# Patient Record
Sex: FEMALE | Race: WHITE
[De-identification: names, ages, dates, MRNs, and addresses within clinical notes are randomized per-mention and may not be internally consistent; named-entity substitution may affect disease eponyms.]

---

## 2018-02-12 NOTE — ER DOCUMENT REPORT
ED General





- General


Chief Complaint: Abdominal Cramping


Stated Complaint: PELVIC PAIN


Mode of Arrival: Ambulatory


Information source: Patient


TRAVEL OUTSIDE OF THE [place] IN LAST 30 DAYS: No





- HPI


Notes: 





27 yr old female presents today with complaints with acute onset of pelvic pain 

after she had sex with her  last night, is concerned that her IUD 

shifted. Denies any vaginal bleeding, vaginal discharge.  Reports she has a 

history of ovarian cysts on both sides.  States that she has had issues with 

ovarian cysts in the past and this pain does not feel like an ovarian cyst 

pain.  Denies pregnancy, cp, sob, n/v/d. denies issues with bowel, urinary 

incontinence or saddle anesthesia since onset of symptoms. denies hx of pid, 

endometriosis or internal surgeries. Pain 1/10, cramping.  Denies fevers or 

chills. Denies any trauma. No otc meds have been tried.  Finally had her Pap 

done a month ago by her OB/GYN





- Related Data


Allergies/Adverse Reactions: 


 





latex Allergy (Verified 18 09:33)


 











Past Medical History





- General


Information source: Patient





- Social History


Smoking Status: Current Every Day Smoker


Frequency of alcohol use: Rare


Drug Abuse: None


Family History: Reviewed & Not Pertinent


Patient has suicidal ideation: No


Patient has homicidal ideation: No


Renal/ Medical History: Denies: Hx Peritoneal Dialysis


Past Surgical History: Reports: Hx  Section - X1, Hx Cholecystectomy, 

Hx Orthopedic Surgery - right ankle, Hx Tonsillectomy





Review of Systems





- Review of Systems


Constitutional: No symptoms reported


EENT: No symptoms reported


Cardiovascular: No symptoms reported


Respiratory: No symptoms reported


Gastrointestinal: No symptoms reported


Genitourinary: No symptoms reported


Female Genitourinary: See HPI


Musculoskeletal: No symptoms reported


Skin: No symptoms reported


Hematologic/Lymphatic: No symptoms reported


Neurological/Psychological: No symptoms reported





Physical Exam





- Vital signs


Vitals: 


 











Temp Pulse Resp BP Pulse Ox


 


 97.9 F   83   16   106/59 L  98 


 


 18 09:40  18 09:40  18 09:40  18 09:40  18 09:40











Interpretation: Normal





- Notes


Notes: 





PHYSICAL EXAMINATION:





GENERAL: Well-appearing, well-nourished and in no acute distress.





HEAD: Atraumatic, normocephalic.





EYES: Pupils equal round and reactive to light, extraocular movements intact, 

conjunctiva are normal.





ENT: Nares patent, oropharynx clear without exudates.  Moist mucous membranes.





NECK: Normal range of motion, supple without lymphadenopathy





LUNGS: Breath sounds clear to auscultation bilaterally and equal.  No wheezes 

rales or rhonchi.





HEART: Regular rate and rhythm without murmurs





ABDOMEN: Soft, nontender, nondistended abdomen.  No guarding, no rebound.  No 

masses appreciated.





Female exam:  External genitalia without erythema, exudate or discharge. 

Vaginal vault is without discharge. Cervix is of normal color without lesion. 

There is no bleeding noted. Uterus is noted to be of normal size and nontender. 

No cervical motion tenderness is seen. No masses are palpated. No string seen 

from IUD.





Musculoskeletal: Normal range of motion, no pitting or edema.  No cyanosis.





NEUROLOGICAL: Cranial nerves grossly intact.  Normal speech, normal gait.  

Normal sensory, motor exams





PSYCH: Normal mood, normal affect.





SKIN: Warm, Dry, normal turgor, no rashes or lesions noted.





Course





- Re-evaluation


Re-evalutation: 








Rechecked the patient who is resting comfortably. On re-exam, patient is 

symptomatically improved. Discussed the results of the labs/radiology as well 

as the diagnosis at great length.  IUD is in place, likely her pain is coming 

from her cysts.  Advised to follow-up with her OB/GYN within 3 days.  Take over-

the-counter ibuprofen Tylenol for pain control, apply heat 20 minutes on 20 

minutes off several times a day.  Avoid sexual intercourse until symptoms have 

resolved.  Discussed the need to return to the ER for any new or worsening sx.  

All questions answered. Patient comfortable with the decision to go home.


18 12:45








- Vital Signs


Vital signs: 


 











Temp Pulse Resp BP Pulse Ox


 


 97.9 F   85   16   106/59 L  97 


 


 18 09:42  18 09:42  18 09:42  18 09:42  18 09:42














- Laboratory


Laboratory results interpreted by me: 


 











  18





  10:10


 


Urine Urobilinogen  4.0 H














Discharge





- Discharge


Clinical Impression: 


Ovarian cyst


Qualifiers:


 Laterality: bilateral Qualified Code(s): N83.201 - Unspecified ovarian cyst, 

right side





Condition: Good


Disposition: HOME, SELF-CARE


Instructions:  Ovarian Cyst (Formerly [facility])


Additional Instructions: 


Ovarian Cyst





     Your examination shows the presence of an ovarian cyst.  This is a ball of 

fluid attached to the ovary.  Ovarian cysts in women of child-bearing age are 

usually innocent.  However, the cyst may cause pain when it grows or bursts.  

An innocent ovarian cyst will usually go away by itself.


     When the cyst becomes painful, you should rest.  Pain medication may be 

required.  Some women find a hot water bottle soothing.  The pain usually 

resolves within one or two days.


     After menopause, an ovarian cyst may mean a tumor, and requires more 

aggressive evaluation -- usually surgery is recommended to remove or biopsy the 

cyst.  A very large cyst requires evaluation at any age. Most cysts (even the 

innocent ones) require follow-up examination.


     Call the doctor or return at any time if the pain increases significantly, 

if you become faint, or if you experience vaginal bleeding.





follow up with OB/GYN in 3 days.  Take over-the-counter ibuprofen and Tylenol 

as needed for pain.  Apply heat 20 minutes on 20 minutes off several 2 a day 

to affected area.  Return to emergency room symptoms become worse.  Return 

immediately for any new or worsening symptoms.





Follow up with primary care provider, call tomorrow to make followup 

appointment.


Referrals: 


RAMA CAICEDO MD [ACTIVE STAFF] -  (in 3-5 days)

## 2018-12-05 LAB
APPEARANCE UR: CLEAR
APTT PPP: YELLOW S
BILIRUB UR QL STRIP: NEGATIVE
ERYTHROCYTE [DISTWIDTH] IN BLOOD BY AUTOMATED COUNT: 13.3 %
GLUCOSE UR STRIP-MCNC: NEGATIVE MG/DL
HCT VFR BLD CALC: 42.3 %
HGB BLD-MCNC: 14.4 G/DL
KETONES UR STRIP-MCNC: NEGATIVE MG/DL
MCH RBC QN AUTO: 30.4 PG
MCHC RBC AUTO-ENTMCNC: 34.1 G/DL
MCV RBC AUTO: 89 FL
NITRITE UR QL STRIP: NEGATIVE
PH UR STRIP: 7 [PH]
PLATELET # BLD: 254 10^3/UL
PROT UR STRIP-MCNC: NEGATIVE MG/DL
RBC # BLD AUTO: 4.74 10^6/UL
SP GR UR STRIP: 1.01
UROBILINOGEN UR-MCNC: NEGATIVE MG/DL
WBC # BLD AUTO: 6.9 10^3/UL

## 2018-12-12 ENCOUNTER — HOSPITAL ENCOUNTER (OUTPATIENT)
Dept: HOSPITAL 62 - OROUT | Age: 28
Discharge: HOME | End: 2018-12-12
Attending: OBSTETRICS & GYNECOLOGY
Payer: MEDICAID

## 2018-12-12 VITALS — DIASTOLIC BLOOD PRESSURE: 72 MMHG | SYSTOLIC BLOOD PRESSURE: 113 MMHG

## 2018-12-12 DIAGNOSIS — Z30.2: Primary | ICD-10-CM

## 2018-12-12 DIAGNOSIS — F17.210: ICD-10-CM

## 2018-12-12 DIAGNOSIS — Z88.8: ICD-10-CM

## 2018-12-12 DIAGNOSIS — N80.8: ICD-10-CM

## 2018-12-12 DIAGNOSIS — Z91.040: ICD-10-CM

## 2018-12-12 DIAGNOSIS — Z88.5: ICD-10-CM

## 2018-12-12 DIAGNOSIS — Z01.818: ICD-10-CM

## 2018-12-12 DIAGNOSIS — N80.1: ICD-10-CM

## 2018-12-12 PROCEDURE — 85027 COMPLETE CBC AUTOMATED: CPT

## 2018-12-12 PROCEDURE — 36415 COLL VENOUS BLD VENIPUNCTURE: CPT

## 2018-12-12 PROCEDURE — 81005 URINALYSIS: CPT

## 2018-12-12 PROCEDURE — 81025 URINE PREGNANCY TEST: CPT

## 2018-12-12 PROCEDURE — 58671 LAPAROSCOPY TUBAL BLOCK: CPT

## 2018-12-12 RX ADMIN — FENTANYL CITRATE ONE MCG: 50 INJECTION INTRAMUSCULAR; INTRAVENOUS at 13:40

## 2018-12-12 RX ADMIN — PROMETHAZINE HYDROCHLORIDE ONE MG: 25 INJECTION INTRAMUSCULAR; INTRAVENOUS at 13:25

## 2018-12-12 RX ADMIN — PROMETHAZINE HYDROCHLORIDE ONE MG: 25 INJECTION INTRAMUSCULAR; INTRAVENOUS at 13:41

## 2018-12-12 RX ADMIN — FENTANYL CITRATE ONE MCG: 50 INJECTION INTRAMUSCULAR; INTRAVENOUS at 13:22

## 2018-12-13 NOTE — OPERATIVE REPORT
Operative Report


DATE OF SURGERY: 18


PREOPERATIVE DIAGNOSIS: Undesired Fertility


POSTOPERATIVE DIAGNOSIS: [place], endometriosis


OPERATION: Laparoscopic Tubal ligation with Filschie clips


SURGEON: RAMA CAICEDO


ANESTHESIA: GA


TISSUE REMOVED OR ALTERED: none


COMPLICATIONS: 


none


ESTIMATED BLOOD LOSS: less than 5ml


INTRAOPERATIVE FINDINGS: scar and probable endometriosis implants above bladder 

and in ovarian fossas, Bilateral fallopian tubes occluded with Filschie clips 

times two.


PROCEDURE: 


Anesthesiologist: Malena Daley MD, CRNA





IV fluids:  [900ml]





Urine output: [400ml]





Indications: [29yo  here for scheduled Bilateral tubal ligation.  She is 

100 sure that she has completed childbearing.  She currently has a Mirena for 

contraecption and for menstrual cycle control.  Mirena is due to be removed 

Sept/Oct 2019. The risks, benefits, alternatives were reviewed with the patient 

and she desires to proceed with planned procedure.]





Procedure: The patient was taken to the operating room where general anesthesia 

was obtained without difficulty.  The patient was then examined under anesthesia

with findings as noted above with a small anteverted uterus.  She was then 

placed in dorsal supine lithotomy position and prepped and draped in the normal 

sterile fashion.  [place] speculum was then placed in the patient's vagina and 

the anterior lip of the cervix grasped with a single-tooth tenaculum.  A [employer] 

uterine manipulator was then advanced into the uterus to provide a means of 

manipulation of the uterus.  The speculum and tenaculum were then removed from 

the patient's cervix and vagina.  Attention was then turned to the patient's 

abdomen where a 5 mm infraumbilical skin incision was then made.  The Optiview 

trocar with 0 laparoscope was then advanced without difficulty under direct 

visualization with the Optiview trocar.  This was performed while tenting the 

abdominal wall and these will fashion.  Intraperitoneal placement was confirmed 

by the direct visualization.  Pneumoperitoneum was then obtained with 

approximately 4 L carbon dioxide gas.  Survey of the patient's abdomen and 

pelvis revealed findings as noted above.  A second skin incision was then made 

approximately 2 cm above the symphysis pubis in the midline.  This incisions 

were made under direct visualization with the laparoscope.  The second trochars 

was then advanced under direct visualization of the laparoscope.  The right 

fallopian tube was then identified and followed out to the fimbriated end and 

the Filschie clip was placed times two in the mid ampullary portion of the 

fallopian tube.  Attention was then turned to the left adnexa at which time the 

left fallopian tube was identified and followed out to the fimbriated end.  

Filschie clips times two on the left fallopian tube in the mid ampullary portion

of the fallopian tube.  The left and right fallopian tubes were removed easily 

through the trocar.  All operative sites were visualized and noted to be 

hemostatic.  The additional trochar was removed under direct visualization.  The

5 mm trocar was then removed after abdominal insufflation was removed.  The skin

at all trocar sites were closed with 3-0 Monocryl in a subcuticular fashion with

overlying Dermabond.


No antibiotics were indicated for this procedure.  After completion of skin 

closure of the trocar sites attention was then turned to the vagina where the 

[place] uterine manipulator was removed and the bivalve speculum was replaced.  

Silver nitrate was applied to the tenaculum sites for hemostasis and the 

speculum was removed.  Sponge lap needle and instrument counts were correct 3. 

The patient tolerated the procedure well and was taken to the recovery area 

awake and in stable condition.

## 2019-01-22 ENCOUNTER — HOSPITAL ENCOUNTER (EMERGENCY)
Dept: HOSPITAL 62 - ER | Age: 29
LOS: 1 days | Discharge: HOME | End: 2019-01-23
Payer: MEDICAID

## 2019-01-22 DIAGNOSIS — T78.40XA: Primary | ICD-10-CM

## 2019-01-22 DIAGNOSIS — R21: ICD-10-CM

## 2019-01-22 DIAGNOSIS — F17.200: ICD-10-CM

## 2019-01-22 DIAGNOSIS — L50.9: ICD-10-CM

## 2019-01-22 PROCEDURE — 96372 THER/PROPH/DIAG INJ SC/IM: CPT

## 2019-01-22 PROCEDURE — 99282 EMERGENCY DEPT VISIT SF MDM: CPT

## 2019-01-23 VITALS — SYSTOLIC BLOOD PRESSURE: 116 MMHG | DIASTOLIC BLOOD PRESSURE: 80 MMHG

## 2019-01-23 NOTE — ER DOCUMENT REPORT
ED General





- General


Chief Complaint: Allergic Reaction


Stated Complaint: POSSIBLE ALLERGIC REACTION


Time Seen by Provider: 19 00:12


Mode of Arrival: Ambulatory


Information source: Patient


Notes: 





28-year-old female with no significant past medical history presents with 

complaint of pruritic rash that developed this morning.  Patient states that she

awoke this morning with an erythematous rash on her chest, abdomen back and 

upper extremities.  She denies any new exposures, new medications, new soaps, 

lotions.  Patient denies prior similar symptoms.  She states that she did spend 

the weekend with her mother and is unsure if she used anything differently.  

Patient did take Benadryl this morning, fell asleep and then awoke with 

worsening itching.  Patient drove here herself.  She denies any shortness of 

breath, wheezing, coughing, vomiting.


TRAVEL OUTSIDE OF THE [place] IN LAST 30 DAYS: No





- HPI


Onset: This morning


Onset/Duration: Sudden


Quality of pain: No pain


Associated symptoms: None.  denies: Chest pain, Nonproductive cough, Productive 

cough, Nausea, Vomiting, Shortness of breath


Exacerbated by: Denies


Relieved by: Denies


Similar symptoms previously: No


Recently seen / treated by doctor: No





- Related Data


Allergies/Adverse Reactions: 


                                        





ethinyl estradiol [From Tri-Sprintec (28)] Allergy (Verified 19 00:04)


   


latex Allergy (Verified 19 00:04)


   


norgestimate [From Tri-Sprintec (28)] Allergy (Verified 19 00:04)


   


propoxyphene [From Darvocet-N] Allergy (Verified 19 00:04)


   Anaphylaxis











Past Medical History





- General


Information source: Patient





- Social History


Smoking Status: Current Every Day Smoker


Frequency of alcohol use: Social


Drug Abuse: None


Family History: Reviewed & Not Pertinent


Patient has suicidal ideation: No


Patient has homicidal ideation: No





- Medical History


Medical History: Negative


Renal/ Medical History: Denies: Hx Peritoneal Dialysis


Past Surgical History: Reports: Hx  Section - X1, Hx Cholecystectomy, Hx

Orthopedic Surgery - right ankle, Hx Tonsillectomy, Hx Tubal Ligation





Review of Systems





- Review of Systems


Notes: 





REVIEW OF SYSTEMS:


CONSTITUTIONAL :  Denies fever,  chills, or sweats.  Denies recent illness. 

Denies weight loss, recent hospitalizations. 


EENT: Denies visual changes, eye pain.  Denies sore throat, oral lesions, dif

ficulty swallowing.


CARDIOVASCULAR:  Denies chest pain.  Denies palpitations. Denies lower extremity

edema.


RESPIRATORY:  Denies cough. Denies shortness of breath, wheezing.


GASTROINTESTINAL:  Denies abdominal pain or distention.  Denies nausea, 

vomiting, or diarrhea.  Denies blood in vomitus, stools, or per rectum.  Denies 

black, tarry stools.  Denies constipation.  


GENITOURINARY:  Denies difficulty urinating, painful urination,  frequency, 

blood in urine, or  vaginal discharge.


MUSCULOSKELETAL:  Denies back or neck pain or stiffness.  Denies joint pain or 

swelling.


SKIN:   + Rash


HEMATOLOGIC :   Denies easy bruising or bleeding.


LYMPHATIC:  Denies swollen glands.


NEUROLOGICAL:  Denies confusion or altered mental status.  Denies loss of 

consciousness.  Denies dizziness or lightheadedness.  Denies headache.  Denies 

weakness or paralysis.  Denies problems difficulty with ambulation, slurred 

speech.  Denies sensory loss, numbness, or tingling.  Denies seizures.


PSYCHIATRIC:  Denies anxiety or stress.  Denies depression, suicidal ideation, 

or homicidal ideation.  Denies visual or auditory hallucinations.








Physical Exam





- Vital signs


Vitals: 


                                        











Temp Pulse Resp BP Pulse Ox


 


 98.0 F   92   16   129/81 H  97 


 


 19 22:58  19 22:58  19 22:58  19 22:58  19 22:58














- Notes


Notes: 





PHYSICAL EXAMINATION:





GENERAL: Well-appearing, well-nourished and in no acute distress.





HEAD: Atraumatic, normocephalic.





EYES: Pupils equal round and reactive to light, extraocular movements intact, 

conjunctiva are normal.





ENT: Nares patent, oropharynx clear without exudates.  Moist mucous membranes.





NECK: Normal range of motion, supple without lymphadenopathy.  No stridor





LUNGS: Breath sounds clear to auscultation bilaterally and equal.  No wheezes 

rales or rhonchi.





HEART: Regular rate and rhythm without murmurs





ABDOMEN: Soft, nontender, nondistended abdomen.  No guarding, no rebound.  No 

masses appreciated.





Female : deferred





Musculoskeletal: Normal range of motion, no pitting or edema.  No cyanosis.





NEUROLOGICAL: Cranial nerves grossly intact.  Normal speech, normal gait.  

Normal sensory, motor exams





PSYCH: Normal mood, normal affect.





SKIN: Diffuse urticarial rash on the chest, abdomen back and upper extremities.





Course





- Re-evaluation


Re-evalutation: 





                                        











Temp Pulse Resp BP Pulse Ox


 


 98.0 F   92   18   116/80   96 


 


 19 22:58  19 22:58  19 01:01  19 01:01  19 01:01











19 03:55


28-year-old female presents with an acute onset of an urticarial rash that is 

diffusely located.  Patient denies any known allergies, new exposures.  Vital 

signs stable upon arrival.  Patient has no wheezing, vomiting.  Patient did 

drive herself to the emergency department so prednisone, Pepcid and IM 

epinephrine were administered.  During her ED course the patient got a phone 

call that her child was home sick and requested discharge home prior to 

completion of exam.  She does state that she did feel better after receiving the

medications.  She was advised to take Benadryl as needed for rash, itching.  

Patient encouraged to return with any symptoms or if the rash does not resolve. 

Patient was evaluated and treated as appropriate for the patient's presenting 

symptoms and complaint, with consideration of any critical or life threatening 

conditions that may be associated with their obtained history and exam as noted 

above. All results were discussed with patient. Patient provided the opportunity

to ask questions, and express concerns. Patient was educated on treatments based

on their presumed diagnosis as noted above.  At this time we will discharge the 

patient with return precautions and follow-up recommendations.  Verbal discharge

instructions given a the bedside. Medication warnings reviewed. Patient is in 

agreement with this plan and has verbalized understanding of return precautions.







After careful consideration I feel that that patient can be safely discharged 

from the emergency department,  they were advised to followup with a primary 

care physician in 2-3 days. 








Dictation on this chart was performed using voice recognition software and may 

result in unintended grammatical, spelling, syntax or errors.

















- Vital Signs


Vital signs: 


                                        











Temp Pulse Resp BP Pulse Ox


 


 98.0 F   92   18   116/80   96 


 


 19 22:58  19 22:58  19 01:01  19 01:01  19 01:01














Discharge





- Discharge


Clinical Impression: 


 Urticarial rash





Allergic reaction


Qualifiers:


 Encounter type: initial encounter Qualified Code(s): T78.40XA - Allergy, 

unspecified, initial encounter





Condition: Good


Disposition: HOME, SELF-CARE


Instructions:  Acute Allergic Reaction ([facility]), Acute Urticaria ([facility])


Additional Instructions: 


It is unclear what caused your allergic reaction today.  Please take Benadryl 

25-50 mg every 8 hours as needed for itching, rash.  You have been provided 

prednisone and Pepcid to take for the next 5 days.  Please return if you 

experience shortness of breath, difficulty swallowing.








Follow up with your [id number] hours  for further care or return to the ED

IMMEDIATELY if symptoms worsen or you have any concerns.  If you cannot afford 

to follow up with your primary care physician a list of low cost clinics have 

been provided at the end of your discharge papers as well.











Most prescribed medications have multiple side effects.  The safest thing to do 

is when filling your prescription  speak to your pharmacist regarding possible 

interactions with your normal home medications and over the counter medications 

such as Ibuprofen, Tylenol, Benadryl.  If you experience any symptoms that cause

you discomfort or concern you should discontinue the medication immediately and 

return to the emergency room or call your primary care physician.


Prescriptions: 


Famotidine [Pepcid 40 mg Tablet] 40 mg PO DAILY #5 tablet


Prednisone [Deltasone 20 mg Tablet] 2 tab PO DAILY 5 Days #10 tablet

## 2023-06-19 NOTE — RADIOLOGY REPORT (SQ)
EXAM DESCRIPTION:  U/S NON OB PEL TV W/DOPPLER



COMPLETED DATE/TIME:  2/12/2018 12:04 pm



REASON FOR STUDY:  severe pain from sex, unsure if IUD moved



COMPARISON:  None.



TECHNIQUE:  Dynamic and static grayscale images acquired of the pelvis via transvaginal approach and 
recorded on PACS. Additional selected color Doppler and spectral images recorded.



LIMITATIONS:  None.



FINDINGS:  UTERUS: Contour normal.  No mass.

ENDOMETRIAL STRIPE: No focal or generalized thickening. No masses.  IUD present.

CERVIX: No nabothian cysts.

RIGHT OVARY: 1.7 cm simple cyst.

RIGHT OVARY DOPPLER: Normal arterial vascular flow without evidence for torsion.

LEFT OVARY: 3.6 cm simple cyst.

LEFT OVARY DOPPLER: Normal arterial vascular flow without evidence for torsion.

FREE FLUID: None noted.

OTHER: No other significant finding.

MEASUREMENTS:

UTERUS: 4.4 x 5.4 x 10.0 cm.

ENDOMETRIAL STRIPE: 2 mm.

RIGHT OVARY: 2.6 x 2.7 x 3.2 cm.

LEFT OVARY: 3.1 x 4.0 x 5.0 cm.



IMPRESSION:

1. IUD IS IN APPROPRIATE POSITION.

2. SIMPLE CYSTS IN BOTH OVARIES.  3.6 CM CYST LEFT OVARY.

3. OTHERWISE UNREMARKABLE TRANSVAGINAL PELVIC ULTRASOUND.



TECHNICAL DOCUMENTATION:  JOB ID:  [id number]

 2011 [employer]- All Rights Reserved Xray Hand 3 Views, Left